# Patient Record
Sex: MALE | Race: WHITE | NOT HISPANIC OR LATINO | Employment: FULL TIME | URBAN - METROPOLITAN AREA
[De-identification: names, ages, dates, MRNs, and addresses within clinical notes are randomized per-mention and may not be internally consistent; named-entity substitution may affect disease eponyms.]

---

## 2024-03-21 ENCOUNTER — HOSPITAL ENCOUNTER (EMERGENCY)
Facility: HOSPITAL | Age: 64
Discharge: HOME | End: 2024-03-21
Payer: OTHER MISCELLANEOUS

## 2024-03-21 VITALS
OXYGEN SATURATION: 98 % | TEMPERATURE: 97.7 F | RESPIRATION RATE: 18 BRPM | SYSTOLIC BLOOD PRESSURE: 146 MMHG | DIASTOLIC BLOOD PRESSURE: 92 MMHG | HEART RATE: 84 BPM

## 2024-03-21 DIAGNOSIS — G89.29 ACUTE EXACERBATION OF CHRONIC LOW BACK PAIN: Primary | ICD-10-CM

## 2024-03-21 DIAGNOSIS — M54.50 ACUTE EXACERBATION OF CHRONIC LOW BACK PAIN: Primary | ICD-10-CM

## 2024-03-21 PROCEDURE — 99284 EMERGENCY DEPT VISIT MOD MDM: CPT | Performed by: PHYSICIAN ASSISTANT

## 2024-03-21 PROCEDURE — 99282 EMERGENCY DEPT VISIT SF MDM: CPT

## 2024-03-21 PROCEDURE — 96374 THER/PROPH/DIAG INJ IV PUSH: CPT

## 2024-03-21 PROCEDURE — 96372 THER/PROPH/DIAG INJ SC/IM: CPT

## 2024-03-21 RX ORDER — DEXAMETHASONE SODIUM PHOSPHATE 10 MG/ML
10 INJECTION, SOLUTION INTRAMUSCULAR; INTRAVENOUS ONCE
Status: COMPLETED | OUTPATIENT
Start: 2024-03-21 | End: 2024-03-21

## 2024-03-21 RX ORDER — LIDOCAINE 50 MG/G
1 PATCH TOPICAL DAILY
Qty: 7 PATCH | Refills: 0 | Status: SHIPPED | OUTPATIENT
Start: 2024-03-21

## 2024-03-21 RX ORDER — LIDOCAINE 50 MG/G
1 PATCH TOPICAL ONCE
Status: DISCONTINUED | OUTPATIENT
Start: 2024-03-21 | End: 2024-03-21 | Stop reason: HOSPADM

## 2024-03-21 RX ORDER — KETOROLAC TROMETHAMINE 30 MG/ML
30 INJECTION, SOLUTION INTRAMUSCULAR; INTRAVENOUS ONCE
Status: COMPLETED | OUTPATIENT
Start: 2024-03-21 | End: 2024-03-21

## 2024-03-21 RX ORDER — METHYLPREDNISOLONE 4 MG/1
TABLET ORAL
Qty: 21 TABLET | Refills: 0 | Status: SHIPPED | OUTPATIENT
Start: 2024-03-21

## 2024-03-21 RX ORDER — METHOCARBAMOL 500 MG/1
500 TABLET, FILM COATED ORAL 3 TIMES DAILY PRN
Qty: 20 TABLET | Refills: 0 | Status: SHIPPED | OUTPATIENT
Start: 2024-03-21

## 2024-03-21 RX ADMIN — KETOROLAC TROMETHAMINE 30 MG: 30 INJECTION, SOLUTION INTRAMUSCULAR; INTRAVENOUS at 13:58

## 2024-03-21 RX ADMIN — DEXAMETHASONE SODIUM PHOSPHATE 10 MG: 10 INJECTION, SOLUTION INTRAMUSCULAR; INTRAVENOUS at 13:58

## 2024-03-21 RX ADMIN — LIDOCAINE 1 PATCH: 50 PATCH CUTANEOUS at 13:58

## 2024-03-21 NOTE — DISCHARGE INSTRUCTIONS
Follow-up with the spine center for continued low back pain. You can take 3 ibuprofen (600 mg total) every 6 hours and Tylenol (max 3,000 mg/ 24 hours) in between for continuous pain relief. Ice the bony areas that hurt, use heat to relax muscles. Apply ice or heat for 20 mins max at a time. You can try gentle massage or gentle stretching, but do not do anything that causes the pain to become significantly worse. Go to the Emergency Room for any new worsening or concerning symptoms including loss of bowel or bladder function, retaining urine or feces, leg weakness numbness or tingling, rash or fevers.    [phone])  Schedule or Reschedule Outpatient Testing - Option 2  Billing - Option 3  General Info - Option 4  MyChart Help - Option 5  Comprehensive Spine Program - Option 6   COVID - Option 7

## 2024-03-21 NOTE — ED PROVIDER NOTES
History  Chief Complaint   Patient presents with    Back Pain     Patient reports back pain after pulling a bag out of the trash can and twisting back.  Patient tried dealing with it but is having too much pain.     63-year-old male comes in today for evaluation of back pain. He notes a hx of chronic back pain which occasionally would flare. He reports that about a week ago at work he was lifting a bag of garbage out of the trash can and it was stuck due to suction.  Once he was able to get it out he did not have pain however once he threw it to his left into the container he developed sudden onset of pain in his bilateral mid thoracic region.  He has been trying to work despite his pain and has been struggling. He no longer has pain in his mid thoracic region, but now is in his b/l paralumbar region. He has had to go home early several days.  He notes that about 2 days ago he had some swelling of his left lower back (SI joint) with some pain/tingling radiating down his left posterior leg to his knee.  He has not tried taking anything for the pain. Denies any fevers, new neck pain, rash, incontinence or retention, saddle anesthesia. No hx IVDA or epidural abscess. No recent LP or injection          Back Pain  Associated symptoms: no chest pain and no fever        None       History reviewed. No pertinent past medical history.    Past Surgical History:   Procedure Laterality Date    CHOLECYSTECTOMY         History reviewed. No pertinent family history.  I have reviewed and agree with the history as documented.    E-Cigarette/Vaping    E-Cigarette Use Never User      E-Cigarette/Vaping Substances     Social History     Tobacco Use    Smoking status: Every Day     Current packs/day: 0.50     Types: Cigarettes    Smokeless tobacco: Never   Vaping Use    Vaping status: Never Used   Substance Use Topics    Alcohol use: Never    Drug use: Never       Review of Systems   Constitutional:  Negative for fever.   HENT:   Negative for nosebleeds.    Eyes:  Negative for redness.   Respiratory:  Negative for shortness of breath.    Cardiovascular:  Negative for chest pain.   Gastrointestinal:  Negative for blood in stool.   Genitourinary:  Negative for hematuria.   Musculoskeletal:  Positive for back pain and gait problem.   Skin:  Negative for rash.   Neurological:  Negative for seizures.   Psychiatric/Behavioral:  Negative for behavioral problems.        Physical Exam  Physical Exam  Constitutional:       Appearance: Normal appearance.   HENT:      Head: Normocephalic and atraumatic.      Right Ear: External ear normal.      Left Ear: External ear normal.   Eyes:      Extraocular Movements: Extraocular movements intact.      Conjunctiva/sclera: Conjunctivae normal.   Cardiovascular:      Pulses: Normal pulses.   Pulmonary:      Effort: Pulmonary effort is normal.   Musculoskeletal:      Cervical back: No muscular tenderness.      Thoracic back: Bony tenderness (mid thoracic) present.      Lumbar back: Tenderness and bony tenderness (diffuse) present. No deformity. Decreased range of motion (Significantly decreased extension and somewhat limited rotation to the right).   Skin:     General: Skin is warm and dry.      Findings: No bruising.   Neurological:      General: No focal deficit present.      Mental Status: He is alert.      Sensory: Sensory deficit (No deficit to the bilateral inner upper legs, 1st webbed space of feet.  He does note a sensory deficit of about 50% to his left lateral thigh, left lower leg and around outer aspects.) present.      Motor: No weakness (no weakness noted to proximal or distal BUE or BLE, no weakness in great toe).      Gait: Gait normal.      Deep Tendon Reflexes:      Reflex Scores:       Patellar reflexes are 2+ on the right side and 2+ on the left side.     Comments: Straight leg raise negative bilaterally   Psychiatric:         Mood and Affect: Mood normal.         Vital Signs  ED Triage Vitals  [03/21/24 1247]   Temperature Pulse Respirations Blood Pressure SpO2   97.7 °F (36.5 °C) 84 18 146/92 98 %      Temp Source Heart Rate Source Patient Position - Orthostatic VS BP Location FiO2 (%)   Temporal Monitor Sitting Left arm --      Pain Score       --           Vitals:    03/21/24 1247   BP: 146/92   Pulse: 84   Patient Position - Orthostatic VS: Sitting         Visual Acuity      ED Medications  Medications   lidocaine (LIDODERM) 5 % patch 1 patch (1 patch Topical Medication Applied 3/21/24 1358)   ketorolac (TORADOL) injection 30 mg (30 mg Intramuscular Given 3/21/24 1358)   dexamethasone (PF) (DECADRON) injection 10 mg (10 mg Intravenous Given 3/21/24 1358)       Diagnostic Studies  Results Reviewed       None                   No orders to display              Procedures  Procedures         ED Course                                             Medical Decision Making  Low back strain vs sprain vs muscle spasm. Does have some associated radicular sx on his L leg. No red flag sx.    Declined a referral to the comprehensive spine program  Recommend steroids and muscle relaxers  Follow up with workman's comp if continued pain    Risk  Prescription drug management.             Disposition  Final diagnoses:   Acute exacerbation of chronic low back pain     Time reflects when diagnosis was documented in both MDM as applicable and the Disposition within this note       Time User Action Codes Description Comment    3/21/2024  2:09 PM Severino, Shannon Add [M54.50,  G89.29] Acute exacerbation of chronic low back pain           ED Disposition       ED Disposition   Discharge    Condition   Stable    Date/Time   Thu Mar 21, 2024  2:09 PM    Comment   Charlie Tripathi discharge to home/self care.                   Follow-up Information    None         Patient's Medications   Discharge Prescriptions    LIDOCAINE (LIDODERM) 5 %    Apply 1 patch topically over 12 hours daily Remove & Discard patch within 12 hours or as  directed by MD       Start Date: 3/21/2024 End Date: --       Order Dose: 1 patch       Quantity: 7 patch    Refills: 0    METHOCARBAMOL (ROBAXIN) 500 MG TABLET    Take 1 tablet (500 mg total) by mouth 3 (three) times a day as needed for muscle spasms       Start Date: 3/21/2024 End Date: --       Order Dose: 500 mg       Quantity: 20 tablet    Refills: 0    METHYLPREDNISOLONE 4 MG TABLET THERAPY PACK    Use as directed on package       Start Date: 3/21/2024 End Date: --       Order Dose: --       Quantity: 21 tablet    Refills: 0       No discharge procedures on file.    PDMP Review       None            ED Provider  Electronically Signed by             Shannon D Severino, PA-C  03/22/24 2651

## 2024-03-21 NOTE — Clinical Note
Charlie Tripathi was seen and treated in our emergency department on 3/21/2024.                Diagnosis:     Charlie  may return to work on return date.    He may return on this date: 03/23/2024         If you have any questions or concerns, please don't hesitate to call.      Shannon D Severino, PA-C    ______________________________           _______________          _______________  Hospital Representative                              Date                                Time